# Patient Record
Sex: MALE | Race: WHITE | URBAN - METROPOLITAN AREA
[De-identification: names, ages, dates, MRNs, and addresses within clinical notes are randomized per-mention and may not be internally consistent; named-entity substitution may affect disease eponyms.]

---
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
## 2024-08-07 ENCOUNTER — OFFICE VISIT (OUTPATIENT)
Dept: ORTHOPEDIC SURGERY | Age: 19
End: 2024-08-07

## 2024-08-07 DIAGNOSIS — M76.61 ACHILLES TENDINITIS, RIGHT LEG: Primary | ICD-10-CM

## 2024-08-07 PROCEDURE — 99214 OFFICE O/P EST MOD 30 MIN: CPT | Performed by: ORTHOPAEDIC SURGERY

## 2024-08-07 RX ORDER — PROCHLORPERAZINE 25 MG/1
SUPPOSITORY RECTAL
COMMUNITY
Start: 2024-05-21

## 2024-08-07 RX ORDER — INSULIN GLARGINE 100 [IU]/ML
INJECTION, SOLUTION SUBCUTANEOUS
COMMUNITY
Start: 2023-12-13

## 2024-08-07 RX ORDER — SIMVASTATIN 10 MG
10 TABLET ORAL EVERY EVENING
COMMUNITY
Start: 2024-06-24 | End: 2025-06-24

## 2024-08-07 NOTE — PROGRESS NOTES
Name: Eric Gaming  YOB: 2005  Gender: male  MRN: [id number]    Summary:     Right Achilles strain with plantaris tear     CC: New Patient (Right foot achilles pain )       HPI: Eric Gaming is a 19 y.o. male who presents with New Patient (Right foot achilles pain )  .  This patient presents to the office in 3 history of pain in his right Achilles after running up a hill.    History was obtained by Patient     ROS/Meds/PSH/PMH/FH/SH: I personally reviewed the patients standard intake form.  Below are the pertinents    Tobacco:  reports that he has never smoked. He has never used smokeless tobacco.  Diabetes: None      Physical Examination:    Exam the right lower extremity show some tenderness along the medial aspect of the Achilles.  Is no sign of Achilles tear insufficiency identified.    Imaging:   No imaging reviewed           JOSE GUTIÉRREZ III, MD           Assessment:   Right probable plantaris injury with Achilles trying    Treatment Plan:   4 This is a chronic illness/condition with exacerbation and progression  Treatment at this time: Physical Therapy and Bracing: Placed in: night splint  Studies ordered: NO XR needed @ Next Visit    Weight-bearing status: WBAT        Return to work/work restrictions: none  No medications given    He will try an off-the-shelf night splint addition to home exercise program provided today.